# Patient Record
Sex: FEMALE | Employment: UNEMPLOYED | URBAN - METROPOLITAN AREA
[De-identification: names, ages, dates, MRNs, and addresses within clinical notes are randomized per-mention and may not be internally consistent; named-entity substitution may affect disease eponyms.]

---

## 2017-03-02 ENCOUNTER — HOSPITAL ENCOUNTER (OUTPATIENT)
Facility: HOSPITAL | Age: 27
Setting detail: HOSPITAL OUTPATIENT SURGERY
Discharge: HOME | End: 2017-03-02
Attending: INTERNAL MEDICINE | Admitting: INTERNAL MEDICINE
Payer: COMMERCIAL

## 2017-03-02 ENCOUNTER — SURGERY (OUTPATIENT)
Age: 27
End: 2017-03-02

## 2017-03-02 VITALS
TEMPERATURE: 99 F | WEIGHT: 200 LBS | HEART RATE: 75 BPM | RESPIRATION RATE: 16 BRPM | BODY MASS INDEX: 32.14 KG/M2 | SYSTOLIC BLOOD PRESSURE: 128 MMHG | HEIGHT: 66 IN | DIASTOLIC BLOOD PRESSURE: 75 MMHG | OXYGEN SATURATION: 100 %

## 2017-03-02 DIAGNOSIS — K62.5 HEMORRHAGE OF RECTUM AND ANUS: ICD-10-CM

## 2017-03-02 DIAGNOSIS — R19.4 CHANGE IN BOWEL HABITS: ICD-10-CM

## 2017-03-02 DIAGNOSIS — K21.9 GASTROESOPHAGEAL REFLUX DISEASE, ESOPHAGITIS PRESENCE NOT SPECIFIED: ICD-10-CM

## 2017-03-02 LAB
POCT LOT NUMBER: NORMAL
POCT URINE PREGNANCY: NEGATIVE

## 2017-03-02 PROCEDURE — 0DJD8ZZ INSPECTION OF LOWER INTESTINAL TRACT, VIA NATURAL OR ARTIFICIAL OPENING ENDOSCOPIC: ICD-10-PCS | Performed by: INTERNAL MEDICINE

## 2017-03-02 PROCEDURE — 0DB98ZX EXCISION OF DUODENUM, VIA NATURAL OR ARTIFICIAL OPENING ENDOSCOPIC, DIAGNOSTIC: ICD-10-PCS | Performed by: INTERNAL MEDICINE

## 2017-03-02 PROCEDURE — 88305 TISSUE EXAM BY PATHOLOGIST: CPT | Performed by: INTERNAL MEDICINE

## 2017-03-02 RX ORDER — SODIUM CHLORIDE, SODIUM LACTATE, POTASSIUM CHLORIDE, CALCIUM CHLORIDE 600; 310; 30; 20 MG/100ML; MG/100ML; MG/100ML; MG/100ML
INJECTION, SOLUTION INTRAVENOUS CONTINUOUS
Status: DISCONTINUED | OUTPATIENT
Start: 2017-03-02 | End: 2017-03-02

## 2017-03-02 RX ORDER — MIDAZOLAM HYDROCHLORIDE 5 MG/ML
INJECTION INTRAMUSCULAR; INTRAVENOUS
Status: DISCONTINUED | OUTPATIENT
Start: 2017-03-02 | End: 2017-03-02

## 2017-03-02 NOTE — H&P
[facility] Endoscopy Health History   [facility] Department of  Gastroenterology  Update Health History :       Zach Mixon  female   Vandana Gong MD     [id number]  12/1[birth year]1989 Primary Care Physician  Claudia Dunbar MD        HP Review of Symptoms:  A comprehensive 10 point review of systems was completed.       Physical Exam:    /86 mmHg  Pulse 89  Temp(Src) 98.6 °F (37 °C) (Oral)  Resp 16  Ht 5' 6\" (1.676 m)  Wt 200 lb (90.719 kg)  BMI 32.30 kg/m2  SpO2 100%  LMP 02/

## 2017-03-02 NOTE — OPERATIVE REPORT
[facility]    PATIENT'S NAME: Italo Román   ATTENDING PHYSICIAN: Slim Caldwell M.D. OPERATING PHYSICIAN: Slim Caldwell M.D.    PATIENT ACCOUNT#:   [de-identified]    LOCATION:  [facility] ENDO POOL ROOMS 1 EDWP 10  MEDICAL RECORD #:   [id number]

## 2017-03-02 NOTE — BRIEF OP NOTE
[facility] ENDOSCOPY  Brief Op Note     Lisha Cardenas Location: OR   CSN [id number] MRN [id number]   Admission Date 3/2/2017 Operation Date 3/2/2017   Attending Physician Karen Gregory MD Operating Physician Oliva Ortiz MD       Pre-Ope

## 2017-03-06 NOTE — PROGRESS NOTES
Quick Note:    3/6/2017  Krissy Richter [place]  [id number]  [place] [id number]    Dear Nadyne Closs,       Here are the biopsy/pathology findings from your recent EGD (Upper Endoscopy):    a negative test for celiac disease      If you need any further assistanc

## 2017-03-27 PROCEDURE — 88175 CYTOPATH C/V AUTO FLUID REDO: CPT | Performed by: OBSTETRICS & GYNECOLOGY

## (undated) DEVICE — FORCEP BIOPSY RJ4 LG CAP W/ND

## (undated) DEVICE — Device: Brand: DEFENDO AIR/WATER/SUCTION AND BIOPSY VALVE

## (undated) NOTE — IP AVS SNAPSHOT
[facility] [place] [address] [place], [address] ~ [phone]                Discharge Summary   3/2/2017    Chucky Jones           Admission Information        Provider Department    3/2/2017 Kaushal Ovalles MD  Endoscopy Home Care Instructions for Colonoscopy and/or Gastroscopy with Sedation    Diet:  - Resume your regular diet as tolerated unless otherwise instructed. - Start with light meals to minimize bloating.  - Do not drink alcohol today.     Medication:  - If you h [place] Parent [id number]  [phone]        Immunization History as of 3/2/2017  Never Reviewed    DTAP 5/20/1996, 8/15/1991, 7/13/1990, 4/18/1990, 2/5/1990    HEP A 5/27/2014    HEP B 4/14/2015, 11/26/2014, 10/7/2014, 6/25/1997, 1/20/1997, 12/22/1996    HIB We are concerned for your overall well being:    - If you are a smoker or have smoked in the last 12 months, we encourage you to explore options for quitting.     - If you have concerns related to behavioral health issues or thoughts of harming yourself,

## (undated) NOTE — LETTER
3/6/2017          Raúl Jones  [id number]  [place] [address]    Dear Payton Kendall,       Here are the biopsy/pathology findings from your recent EGD (Upper  Endoscopy):    a negative test for celiac disease      If you need any further assistance,